# Patient Record
Sex: MALE | URBAN - METROPOLITAN AREA
[De-identification: names, ages, dates, MRNs, and addresses within clinical notes are randomized per-mention and may not be internally consistent; named-entity substitution may affect disease eponyms.]

---
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
## 2025-05-14 NOTE — ED PROVIDER NOTE - CROS ED PSYCH ALL NEG
Creatinine and GFR have been relatively stable.  Currently have improved.  Lab Results   Component Value Date    EGFR 71 05/08/2025    EGFR 72 01/09/2025    EGFR 65 11/13/2024    CREATININE 0.79 05/08/2025    CREATININE 0.79 01/09/2025    CREATININE 0.86 11/13/2024       Orders:  •  CBC and differential; Future  •  Comprehensive metabolic panel; Future   - - -